# Patient Record
Sex: FEMALE | Race: WHITE | NOT HISPANIC OR LATINO | URBAN - METROPOLITAN AREA
[De-identification: names, ages, dates, MRNs, and addresses within clinical notes are randomized per-mention and may not be internally consistent; named-entity substitution may affect disease eponyms.]

---

## 2025-05-30 ENCOUNTER — TELEPHONE (OUTPATIENT)
Dept: PEDIATRICS | Facility: CLINIC | Age: 30
End: 2025-05-30
Payer: COMMERCIAL

## 2025-05-30 DIAGNOSIS — Z00.00 ROUTINE ADULT HEALTH MAINTENANCE: ICD-10-CM

## 2025-05-30 DIAGNOSIS — R53.83 FATIGUE, UNSPECIFIED TYPE: Primary | ICD-10-CM

## 2025-05-30 NOTE — TELEPHONE ENCOUNTER
Pt wanted to know if she can get labwork sent in before her visit with Vicky on 6/10/25. She wanted to try and get them done before.

## 2025-05-30 NOTE — TELEPHONE ENCOUNTER
Patient has an annual physical scheduled with Vicky on 06/10/2025. I have pended routine lab orders to be placed.     Please review and sign lab orders for patient to complete before her appointment.

## 2025-06-02 PROBLEM — F90.2 ADHD (ATTENTION DEFICIT HYPERACTIVITY DISORDER), COMBINED TYPE: Status: ACTIVE | Noted: 2025-06-02

## 2025-06-02 PROBLEM — F41.9 ANXIETY: Status: ACTIVE | Noted: 2025-06-02

## 2025-06-10 ENCOUNTER — APPOINTMENT (OUTPATIENT)
Dept: PRIMARY CARE | Facility: CLINIC | Age: 30
End: 2025-06-10
Payer: COMMERCIAL

## 2025-06-10 VITALS
DIASTOLIC BLOOD PRESSURE: 73 MMHG | SYSTOLIC BLOOD PRESSURE: 101 MMHG | OXYGEN SATURATION: 99 % | HEIGHT: 64 IN | RESPIRATION RATE: 16 BRPM | WEIGHT: 192 LBS | HEART RATE: 78 BPM | TEMPERATURE: 97.6 F | BODY MASS INDEX: 32.78 KG/M2

## 2025-06-10 DIAGNOSIS — R53.83 FATIGUE, UNSPECIFIED TYPE: ICD-10-CM

## 2025-06-10 DIAGNOSIS — J45.990 EXERCISE-INDUCED ASTHMA: ICD-10-CM

## 2025-06-10 DIAGNOSIS — F90.2 ADHD (ATTENTION DEFICIT HYPERACTIVITY DISORDER), COMBINED TYPE: ICD-10-CM

## 2025-06-10 DIAGNOSIS — Z72.0 VAPES NICOTINE CONTAINING SUBSTANCE: ICD-10-CM

## 2025-06-10 DIAGNOSIS — R63.5 UNEXPLAINED WEIGHT GAIN: ICD-10-CM

## 2025-06-10 DIAGNOSIS — Z00.00 WELLNESS EXAMINATION: Primary | ICD-10-CM

## 2025-06-10 DIAGNOSIS — L65.9 THINNING HAIR: ICD-10-CM

## 2025-06-10 PROBLEM — F41.9 ANXIETY: Status: RESOLVED | Noted: 2025-06-02 | Resolved: 2025-06-10

## 2025-06-10 PROCEDURE — 99385 PREV VISIT NEW AGE 18-39: CPT | Performed by: NURSE PRACTITIONER

## 2025-06-10 RX ORDER — LISDEXAMFETAMINE DIMESYLATE 30 MG/1
30 CAPSULE ORAL DAILY
COMMUNITY

## 2025-06-10 RX ORDER — ALBUTEROL SULFATE 90 UG/1
INHALANT RESPIRATORY (INHALATION)
COMMUNITY
Start: 2020-02-01 | End: 2025-06-10 | Stop reason: ALTCHOICE

## 2025-06-10 RX ORDER — ALBUTEROL SULFATE 90 UG/1
2 INHALANT RESPIRATORY (INHALATION) EVERY 4 HOURS PRN
Qty: 8.5 G | Refills: 0 | Status: SHIPPED | OUTPATIENT
Start: 2025-06-10 | End: 2026-06-10

## 2025-06-10 ASSESSMENT — ENCOUNTER SYMPTOMS
PALPITATIONS: 0
CHILLS: 0
DYSPHORIC MOOD: 0
ENDOCRINE COMMENTS: HAIR THINNING
SLEEP DISTURBANCE: 0
WEAKNESS: 0
BLOOD IN STOOL: 0
UNEXPECTED WEIGHT CHANGE: 1
DECREASED CONCENTRATION: 0
COUGH: 0
NERVOUS/ANXIOUS: 0
LIGHT-HEADEDNESS: 0
HEADACHES: 0
APPETITE CHANGE: 0
NUMBNESS: 0
SHORTNESS OF BREATH: 0
CONSTIPATION: 0
VOMITING: 0
DIARRHEA: 0
FATIGUE: 0
ABDOMINAL PAIN: 0
FEVER: 0
WHEEZING: 0
NAUSEA: 0

## 2025-06-10 ASSESSMENT — PATIENT HEALTH QUESTIONNAIRE - PHQ9
2. FEELING DOWN, DEPRESSED OR HOPELESS: NOT AT ALL
SUM OF ALL RESPONSES TO PHQ9 QUESTIONS 1 AND 2: 0
1. LITTLE INTEREST OR PLEASURE IN DOING THINGS: NOT AT ALL

## 2025-06-10 NOTE — ASSESSMENT & PLAN NOTE
Pt. States this is situational, worse in a big crowd. States she doesn't really struggle with it day to day. However, she states that prior to starting her Vyvanse, she often felt overwhelmed by frustration of not being able to focus and get her tasks done. Symptoms untreated ADHD rather than true anxiety or generalized anxiety disorder.  This is now resolved.

## 2025-06-10 NOTE — PROGRESS NOTES
Subjective   Keyona Coffman is a 30 y.o. female who presents for Establish Care and Annual Exam.    Patient reported health as : Good  Regular dental visists : Yes   Regular eye exams : Yes   Hearing loss : No   Well balanced diet : Yes   Weight Concerns : Yes   Exercise : Regular  Caffeine: Does not drink coffee. Just started drinking Rudi Nu approx 2x/week in an effort to increase wakefulness.   Alcohol Use : Occasional use    HPI  Review of Systems   Constitutional:  Positive for unexpected weight change. Negative for appetite change, chills, fatigue and fever.   Respiratory:  Negative for cough, shortness of breath and wheezing.    Cardiovascular:  Negative for chest pain, palpitations and leg swelling.   Gastrointestinal:  Negative for abdominal pain, blood in stool, constipation, diarrhea, nausea and vomiting.   Endocrine: Negative for cold intolerance and heat intolerance.        Hair thinning   Genitourinary:  Positive for menstrual problem (becoming irregular over the last several months-still on approx 28 day cycle, but will bleed heavily for 2 days, stop for a couple days, and then have spotting for a couple days.).   Neurological:  Negative for weakness, light-headedness, numbness and headaches.   Psychiatric/Behavioral:  Negative for decreased concentration, dysphoric mood and sleep disturbance. The patient is not nervous/anxious.    Objective     Physical Exam  Vitals reviewed.   Constitutional:       General: She is not in acute distress.     Appearance: Normal appearance. She is not ill-appearing.   HENT:      Head: Normocephalic.   Eyes:      Conjunctiva/sclera: Conjunctivae normal.   Neck:      Thyroid: No thyroid mass, thyromegaly or thyroid tenderness.   Cardiovascular:      Rate and Rhythm: Normal rate and regular rhythm.      Pulses: Normal pulses.      Heart sounds: No murmur heard.  Pulmonary:      Effort: Pulmonary effort is normal.      Breath sounds: Normal breath sounds.   Abdominal:  "     General: Bowel sounds are normal.      Palpations: Abdomen is soft.   Musculoskeletal:      Cervical back: Neck supple.      Right lower leg: No edema.      Left lower leg: No edema.   Lymphadenopathy:      Cervical: No cervical adenopathy.   Skin:     General: Skin is warm and dry.   Neurological:      General: No focal deficit present.      Mental Status: She is alert and oriented to person, place, and time.   Psychiatric:         Mood and Affect: Mood normal.         Thought Content: Thought content normal.     /73   Pulse 78   Temp 36.4 °C (97.6 °F) (Temporal)   Resp 16   Ht 1.626 m (5' 4\")   Wt 87.1 kg (192 lb)   SpO2 99%   BMI 32.96 kg/m²     Assessment/Plan     Problem List Items Addressed This Visit       ADHD (attention deficit hyperactivity disorder), combined type    Overview   Follows w/ [facility] w/ Dr. Aashish Fowler. Started taking Vyvanse 30 mg approx. 1/2024.         Current Assessment & Plan   Pt. States her ADHD symptoms are adequately controlled.          Unexplained weight gain    Current Assessment & Plan   Reports she has gained 22 lbs in the last 7 months without any changes in her diet or exercise.         Relevant Orders    Tsh With Reflex To Free T4 If Abnormal    Thinning hair    Relevant Orders    Tsh With Reflex To Free T4 If Abnormal    Fatigue    Relevant Orders    Tsh With Reflex To Free T4 If Abnormal    Exercise-induced asthma    Current Assessment & Plan   Very rarely uses rescue inhaler.         Relevant Medications    albuterol (ProAir HFA) 90 mcg/actuation inhaler    Vapes nicotine containing substance    Overview   Started approx 6/2022 and has done it on and off.          Current Assessment & Plan   She would love to quit vaping. States she vapes before work and after work only. She has been decreasing the % of nicotine in effort to wean herself off, and she is now down to 5% nicotine. Her goal is to get down to 0% nicotine.         "     Other Visit Diagnoses         Wellness examination    -  Primary

## 2025-06-10 NOTE — ASSESSMENT & PLAN NOTE
She would love to quit vaping. States she vapes before work and after work only. She has been decreasing the % of nicotine in effort to wean herself off, and she is now down to 5% nicotine. Her goal is to get down to 0% nicotine.

## 2025-06-11 LAB
ALBUMIN SERPL-MCNC: 4.4 G/DL (ref 3.6–5.1)
ALP SERPL-CCNC: 69 U/L (ref 31–125)
ALT SERPL-CCNC: 15 U/L (ref 6–29)
ANION GAP SERPL CALCULATED.4IONS-SCNC: 7 MMOL/L (CALC) (ref 7–17)
AST SERPL-CCNC: 16 U/L (ref 10–30)
BILIRUB SERPL-MCNC: 0.4 MG/DL (ref 0.2–1.2)
BUN SERPL-MCNC: 10 MG/DL (ref 7–25)
CALCIUM SERPL-MCNC: 9.2 MG/DL (ref 8.6–10.2)
CHLORIDE SERPL-SCNC: 106 MMOL/L (ref 98–110)
CO2 SERPL-SCNC: 29 MMOL/L (ref 20–32)
CREAT SERPL-MCNC: 0.73 MG/DL (ref 0.5–0.96)
EGFRCR SERPLBLD CKD-EPI 2021: 115 ML/MIN/1.73M2
ERYTHROCYTE [DISTWIDTH] IN BLOOD BY AUTOMATED COUNT: 12.3 % (ref 11–15)
GLUCOSE SERPL-MCNC: 78 MG/DL (ref 65–99)
HCT VFR BLD AUTO: 41.5 % (ref 35–45)
HGB BLD-MCNC: 13.2 G/DL (ref 11.7–15.5)
MCH RBC QN AUTO: 30.3 PG (ref 27–33)
MCHC RBC AUTO-ENTMCNC: 31.8 G/DL (ref 32–36)
MCV RBC AUTO: 95.2 FL (ref 80–100)
PLATELET # BLD AUTO: 286 THOUSAND/UL (ref 140–400)
PMV BLD REES-ECKER: 9.4 FL (ref 7.5–12.5)
POTASSIUM SERPL-SCNC: 4.7 MMOL/L (ref 3.5–5.3)
PROT SERPL-MCNC: 6.8 G/DL (ref 6.1–8.1)
RBC # BLD AUTO: 4.36 MILLION/UL (ref 3.8–5.1)
SODIUM SERPL-SCNC: 142 MMOL/L (ref 135–146)
TSH SERPL-ACNC: 1.85 MIU/L
WBC # BLD AUTO: 5.5 THOUSAND/UL (ref 3.8–10.8)

## 2025-10-09 ENCOUNTER — APPOINTMENT (OUTPATIENT)
Facility: CLINIC | Age: 30
End: 2025-10-09
Payer: COMMERCIAL

## 2026-06-11 ENCOUNTER — APPOINTMENT (OUTPATIENT)
Dept: PRIMARY CARE | Facility: CLINIC | Age: 31
End: 2026-06-11
Payer: COMMERCIAL